# Patient Record
Sex: FEMALE | Race: WHITE | NOT HISPANIC OR LATINO | Employment: OTHER | URBAN - METROPOLITAN AREA
[De-identification: names, ages, dates, MRNs, and addresses within clinical notes are randomized per-mention and may not be internally consistent; named-entity substitution may affect disease eponyms.]

---

## 2017-10-05 ENCOUNTER — OFFICE VISIT (OUTPATIENT)
Dept: ALLERGY | Facility: CLINIC | Age: 77
End: 2017-10-05
Payer: MEDICARE

## 2017-10-05 DIAGNOSIS — L23.0 ALLERGIC CONTACT DERMATITIS DUE TO METALS: ICD-10-CM

## 2017-10-05 DIAGNOSIS — L23.1 ALLERGIC CONTACT DERMATITIS DUE TO ADHESIVES: Primary | ICD-10-CM

## 2017-10-05 DIAGNOSIS — L30.9 DERMATITIS: ICD-10-CM

## 2017-10-05 PROCEDURE — 99213 OFFICE O/P EST LOW 20 MIN: CPT | Mod: PBBFAC | Performed by: ALLERGY & IMMUNOLOGY

## 2017-10-05 PROCEDURE — 99204 OFFICE O/P NEW MOD 45 MIN: CPT | Mod: S$PBB,,, | Performed by: ALLERGY & IMMUNOLOGY

## 2017-10-05 PROCEDURE — 99999 PR PBB SHADOW E&M-EST. PATIENT-LVL III: CPT | Mod: PBBFAC,,, | Performed by: ALLERGY & IMMUNOLOGY

## 2017-10-05 RX ORDER — FAMOTIDINE 10 MG/1
10 TABLET ORAL 2 TIMES DAILY
COMMUNITY
End: 2020-07-22

## 2017-10-05 RX ORDER — MINERAL OIL
180 ENEMA (ML) RECTAL 2 TIMES DAILY
COMMUNITY
End: 2018-04-16

## 2017-10-06 VITALS
TEMPERATURE: 98 F | DIASTOLIC BLOOD PRESSURE: 70 MMHG | HEART RATE: 76 BPM | WEIGHT: 132.06 LBS | BODY MASS INDEX: 22 KG/M2 | HEIGHT: 65 IN | SYSTOLIC BLOOD PRESSURE: 120 MMHG | RESPIRATION RATE: 15 BRPM

## 2017-10-06 NOTE — PROGRESS NOTES
Chief complaint: ALLERGIES    This note was dictated using voice recognition software and may contain errors.    History:    She had a 10 AM appointment on Thursday over 5 2017.  She was accompanied by her .  He was present throughout the entire appointment.    She brought with her multiple copies of outside medical records.  She has experienced ALLERGIC contact dermatitis.  She brought with her the results of diagnostic patch testing which has been performed in the past.  She has seen Dr. Quiñonez and  or dermatology evaluation care and treatment.    In the past diagnostic patch testing was performed April 11, 2014 and February 13, 2017.    In 2014 positive patch tests were noted for lanolin, balsam of [place], fragrance mix and tin oxide.  In 2017 positive patch tests were noted for nickel, bronopol ,linalool, cinnamic aldehyde.    She is been symptomatic for at least 3.5 years.  She stated 2 weeks ago after eating a salad she stated her facial dermatitis worsen significantly.  She was told that conceivably this could've occurred due to her medical sensitivity.    She is aware of the fact that she does suffer from ALLERGIC dermatitis due to contact with poison IVy.  She is aware of the fact that bandages with adhesives will provoke contact dermatitis.    She does not believe that she is ALLERGIC to any foods or animals.  She does not wear fingernail polish.  She does not wear goggles about her eyes.  She does use Vanicream.    Review of systems: She experiences itching of the scalp in addition to inflammation of the skin of the face about her eyes and mouth with associated itching and flaking of the skin.  She stated that she wishes to feel improved and is willing to do what is needed again improvement.    Information in her medical record regarding her past medical history family history and social history was reviewed and updated today.  Significant additions if any are as noted above or  below.    Exam:    In general she is in no distress.  She is alert oriented well-developed in good mood and attentive  Gait steady  Head no swelling noted  Skin erythema of the skin of the upper eyelids and below the eyes observed and also at each angle of the mouth extending down toward the chin.  Erythema of the scalp observed in limited.  No urticaria noted  Eyes sclera white conjunctiva pink  Ears not inflamed tympanic membranes not inflamed  Nose patent no polyps seen  Mouth no swelling or inflammation of the lips tongue or in the throat noted  Neck no masses or thyromegaly noted  Lymph nodes no significant cervical or epitrochlear lymphadenopathy noted  Lungs clear to auscultation  Heart no murmurs heard regular rhythm  Extremities no swelling or inflammation of the hands or legs noted    Impression:    #1 dermatitis  #2 itching  #3 ALLERGIC contact dermatitis  #4 other health concerns as noted in her medical record    Assessment and plan:    Her appointment was 60 minutes in duration spent entirely in face-to-face contact.  More than 50% of the visit was spent in counseling and coordination of care and in reviewing copies of outside medical records.    I told her I am not a dermatologist.  I told her I did not perform patch tests.  I told her that I would be happy to speak with Dr. López in dermatology regarding since to consider for additional evaluation and treatment.  She stated that she would be very appreciative if I will do so.  After I have had an opportunity to speak with Dr. López in dermatology I will contact her.  Additional recommendations may be made at that time.  Given the office phone number.  Should she have additional questions or concerns she was instructed to call.    I recommended that she continue to avoid all substances which she is observed provoke an adverse effect.  We discussed the use of cool or cold compresses to the facial skin with subsequent application of a bland material such  as olive oil.  I reviewed with her that dry skin may cause itching.

## 2017-10-09 ENCOUNTER — TELEPHONE (OUTPATIENT)
Dept: ALLERGY | Facility: CLINIC | Age: 77
End: 2017-10-09

## 2017-10-09 ENCOUNTER — OFFICE VISIT (OUTPATIENT)
Dept: DERMATOLOGY | Facility: CLINIC | Age: 77
End: 2017-10-09
Payer: MEDICARE

## 2017-10-09 VITALS — WEIGHT: 132 LBS | BODY MASS INDEX: 21.97 KG/M2

## 2017-10-09 DIAGNOSIS — L23.9 ALLERGIC DERMATITIS: Primary | ICD-10-CM

## 2017-10-09 PROCEDURE — 99213 OFFICE O/P EST LOW 20 MIN: CPT | Mod: PBBFAC,PO | Performed by: DERMATOLOGY

## 2017-10-09 PROCEDURE — 99999 PR PBB SHADOW E&M-EST. PATIENT-LVL III: CPT | Mod: PBBFAC,,, | Performed by: DERMATOLOGY

## 2017-10-09 PROCEDURE — 99202 OFFICE O/P NEW SF 15 MIN: CPT | Mod: S$PBB,,, | Performed by: DERMATOLOGY

## 2017-10-09 RX ORDER — CALCIUM CARBONATE/VITAMIN D3 600MG-5MCG
TABLET ORAL
COMMUNITY
End: 2020-07-22

## 2017-10-09 RX ORDER — CHOLECALCIFEROL (VITAMIN D3) 125 MCG
5000 CAPSULE ORAL
COMMUNITY
End: 2020-07-22

## 2017-10-09 RX ORDER — ATORVASTATIN CALCIUM 10 MG/1
10 TABLET, FILM COATED ORAL
COMMUNITY
End: 2018-04-16

## 2017-10-09 RX ORDER — FAMOTIDINE 20 MG/1
20 TABLET, FILM COATED ORAL
COMMUNITY
End: 2018-04-16

## 2017-10-09 RX ORDER — MINERAL OIL
180 ENEMA (ML) RECTAL
COMMUNITY
End: 2018-04-16

## 2017-10-09 RX ORDER — ASPIRIN 81 MG/1
81 TABLET ORAL
COMMUNITY
Start: 2017-08-31 | End: 2020-07-22

## 2017-10-09 NOTE — LETTER
October 9, 2017      Edd Desouza MD  [address] [place] [address]           [place] [facility]  [address]  [place] [address]  Phone: [phone]  Fax: [phone]          Patient: Virgie Cherry   MR Number: [id number]   YOB: 1940   Date of Visit: 10/9/2017       Dear Dr. Edd Desouza:    Thank you for referring Virgie Cherry to me for evaluation. Attached you will find relevant portions of my assessment and plan of care.    If you have questions, please do not hesitate to call me. I look forward to following Virgie Cherry along with you.    Sincerely,    Benita Nina MD    Enclosure  CC:  No Recipients    If you would like to receive this communication electronically, please contact [email or web address] or [phone] to request more information on [facility] access.    For providers and/or their staff who would like to refer a patient to [facility], please contact us through our one-stop-shop provider referral line, [facility], at [phone].    If you feel you have received this communication in error or would no longer like to receive these types of communications, please e-mail [email or web address]

## 2017-10-09 NOTE — TELEPHONE ENCOUNTER
This note was dictated using voice recognition software and may contain errors.    I finished my telephone conversation with her at about 9:08 AM on Monday, October 9, 2017.  She had an appointment with me on Thursday, October 5.    I told her this morning I did have an opportunity to speak with Dr. López in dermatology.  Dr. López suggested consideration be given to obtaining evaluation by Dr. eBnita Nina or Dr. Sarah Anderson.    As of the time of our conversation this morning she stated that she is somewhat more symptomatic when she was last Thursday when she saw me on October 5.    Should she have additional questions or concerns she may call.  I was able to provide her the contact phone number for .

## 2017-10-09 NOTE — PROGRESS NOTES
Subjective:       Patient ID:  Virgie Cherry is a 77 y.o. female who presents for   Chief Complaint   Patient presents with    Skin Discoloration     face     History of Present Illness: The patient presents with chief complaint of rash.  Location: face  Duration: years  Signs/Symptoms: itch    Prior treatments: many in past including systemic cortisone and cyclosporine, none now except vanicream, free and clear shampoo, aquanil cleanser    Previous appt allergy 10/5:  She brought with her multiple copies of outside medical records.  She has experienced ALLERGIC contact dermatitis.  She brought with her the results of diagnostic patch testing which has been performed in the past.  She has seen Dr. Quiñonez and  or dermatology evaluation care and treatment.  In 2014 positive patch tests were noted for lanolin, balsam of [place], fragrance mix and tin oxide.  In 2017 positive patch tests were noted for nickel, bronopol ,linalool, cinnamic aldehyde.     She is been symptomatic for at least 3.5 years.  She stated 2 weeks ago after eating a salad she stated her facial dermatitis worsen significantly.  She was told that conceivably this could've occurred due to her medical sensitivity.     She is aware of the fact that she does suffer from ALLERGIC dermatitis due to contact with poison IVy.  She is aware of the fact that bandages with adhesives will provoke contact dermatitis.     She does not believe that she is ALLERGIC to any foods or animals.  She does not wear fingernail polish.  She does not wear goggles about her eyes.  She does use Vanicream.     Review of systems: She experiences itching of the scalp in addition to inflammation of the skin of the face about her eyes and mouth with associated itching and flaking of the skin.   I told her I am not a dermatologist.  I told her I did not perform patch tests.  I told her that I would be happy to speak with Dr. López in dermatology regarding since to  consider for additional evaluation and treatment.  She stated that she would be very appreciative if I will do so.  After I have had an opportunity to speak with Dr. López in dermatology I will contact her.  Additional recommendations may be made at that time.  Given the office phone number.  Should she have additional questions or concerns she was instructed to call.     I recommended that she continue to avoid all substances which she is observed provoke an adverse effect.  We discussed the use of cool or cold compresses to the facial skin with subsequent application of a bland material such as olive oil.  I reviewed with her that dry skin may cause itching.              Review of Systems   Constitutional: Negative for fever.   Skin: Positive for itching and rash.   Hematologic/Lymphatic: Does not bruise/bleed easily.        Objective:    Physical Exam   Constitutional: She appears well-developed and well-nourished. No distress.   Neurological: She is alert and oriented to person, place, and time. She is not disoriented.   Psychiatric: She has a normal mood and affect.   Skin:   Areas Examined (abnormalities noted in diagram):   Scalp / Hair Palpated and Inspected  Head / Face Inspection Performed  Neck Inspection Performed  Chest / Axilla Inspection Performed  Back Inspection Performed  RUE Inspected  LUE Inspection Performed              Diagram Legend      Erythematous eczematous patches and plaques         Assessment / Plan:        Allergic dermatitis  In 2014 positive patch tests were noted for lanolin, balsam of [place], fragrance mix and tin oxide.  In 2017 positive patch tests were noted for nickel, bronopol ,linalool, cinnamic aldehyde.  Synalar ug bid until clear  Basis soap  cerave lotion  Will send list from [employer] to use  Keep hands away from face  Avoid contact with persons wearing perfume             Return if symptoms worsen or fail to improve.

## 2018-04-16 ENCOUNTER — OFFICE VISIT (OUTPATIENT)
Dept: OBSTETRICS AND GYNECOLOGY | Facility: CLINIC | Age: 78
End: 2018-04-16
Payer: MEDICARE

## 2018-04-16 VITALS
DIASTOLIC BLOOD PRESSURE: 76 MMHG | WEIGHT: 130.5 LBS | BODY MASS INDEX: 21.74 KG/M2 | HEIGHT: 65 IN | SYSTOLIC BLOOD PRESSURE: 124 MMHG

## 2018-04-16 DIAGNOSIS — Z01.419 ENCOUNTER FOR GYNECOLOGICAL EXAMINATION WITHOUT ABNORMAL FINDING: Primary | ICD-10-CM

## 2018-04-16 PROCEDURE — 99999 PR PBB SHADOW E&M-EST. PATIENT-LVL III: CPT | Mod: PBBFAC,,, | Performed by: OBSTETRICS & GYNECOLOGY

## 2018-04-16 PROCEDURE — G0439 PPPS, SUBSEQ VISIT: HCPCS | Mod: ,,, | Performed by: OBSTETRICS & GYNECOLOGY

## 2018-04-16 PROCEDURE — 99213 OFFICE O/P EST LOW 20 MIN: CPT | Mod: PBBFAC | Performed by: OBSTETRICS & GYNECOLOGY

## 2018-04-16 RX ORDER — CETIRIZINE HYDROCHLORIDE 10 MG/1
10 TABLET ORAL 2 TIMES DAILY
COMMUNITY
End: 2020-07-22

## 2018-04-19 NOTE — PROGRESS NOTES
CC: Well woman exam    Virgie Cherry is a 78 y.o. female  presents for a well woman exam.  LMP: No LMP recorded. Patient is postmenopausal..  No issues, problems, or complaints.    Past Medical History:   Diagnosis Date    Breast disorder     pre-cancerous cells, prophylactic double mastectomies. treated by Dr. Salinas >30y ago    Hypertension     Hypotension     Multiple allergies      Past Surgical History:   Procedure Laterality Date    masectomy      MASTECTOMY       Social History     Social History    Marital status:      Spouse name: N/A    Number of children: N/A    Years of education: N/A     Social History Main Topics    Smoking status: Never Smoker    Smokeless tobacco: Never Used    Alcohol use No    Drug use: No    Sexual activity: No     Other Topics Concern    None     Social History Narrative    None     Family History   Problem Relation Age of Onset    Heart attack Father     Colon cancer Brother     Breast cancer Sister     Diabetes Neg Hx     Ovarian cancer Neg Hx     Miscarriages / Stillbirths Neg Hx     Hypertension Neg Hx     Eclampsia Neg Hx      labor Neg Hx     Stroke Neg Hx     Cancer Neg Hx      OB History      Para Term  AB Living    3 3 3 0 0 3    SAB TAB Ectopic Multiple Live Births    0 0 0 0 3          Current Outpatient Prescriptions:     aspirin (ECOTRIN) 81 MG EC tablet, Take 81 mg by mouth., Disp: , Rfl:     CALCIUM CARBONATE-VITAMIN D3 ORAL, Take by mouth., Disp: , Rfl:     calcium-vitamin D tablet 600 mg-200 units, Take by mouth., Disp: , Rfl:     cetirizine (ZYRTEC) 10 MG tablet, Take 10 mg by mouth 2 (two) times daily., Disp: , Rfl:     cholecalciferol, vitamin D3, 5,000 unit capsule, Take 5,000 Units by mouth., Disp: , Rfl:     crisaborole 2 % Oint, Apply  topically 2 (two) times daily., Disp: , Rfl:     famotidine (PEPCID) 10 MG tablet, Take 10 mg by mouth 2 (two) times daily., Disp: , Rfl:      "metoprolol succinate (TOPROL-XL) 25 MG 24 hr tablet, Take 25 mg by mouth once daily., Disp: , Rfl:     psyllium (METAMUCIL) powder, Take 1 packet by mouth., Disp: , Rfl:     GYNECOLOGY HISTORY:  No abnormal pap/std    DATA REVIEWED:  Last pap: normal Date: 2008  Last mmg: bilateral mastectomy for pre-cancerous cells-no longer has to do mmg   Last colonoscopy: normal Date: 2012  Last DEXA: osteoporosis Date:  2016    /76 (BP Location: Right arm, Patient Position: Sitting, BP Method: Medium (Manual))   Ht 5' 5" (1.651 m)   Wt 59.2 kg (130 lb 8.2 oz)   BMI 21.72 kg/m²     ROS:  GENERAL: Denies weight gain or weight loss. Feeling well overall.   SKIN: Denies rash or lesions.   HEAD: Denies head injury or headache.   NODES: Denies enlarged lymph nodes.   CHEST: Denies chest pain or shortness of breath.   CARDIOVASCULAR: Denies palpitations or left sided chest pain.   ABDOMEN: No abdominal pain, constipation, diarrhea, nausea, vomiting or rectal bleeding.   URINARY: No frequency, dysuria, hematuria, or burning on urination.  REPRODUCTIVE: See HPI.   BREASTS: The patient denies pain, lumps, or nipple discharge.   HEMATOLOGIC: No easy bruisability or excessive bleeding.   MUSCULOSKELETAL: Denies joint pain or swelling.   NEUROLOGIC: Denies syncope or weakness.   PSYCHIATRIC: Denies depression, anxiety or mood swings.    PHYSICAL EXAM:    APPEARANCE: Well nourished, well developed, in no acute distress.  AFFECT: WNL, alert and oriented x 3  SKIN: No acne or hirsutism  NECK: Neck symmetric without masses or thyromegaly  NODES: No inguinal, cervical, axillary, or femoral lymph node enlargement  CHEST: Good respiratory effect  ABDOMEN: Soft.  No tenderness or masses.  No hepatosplenomegaly.  No hernias.  BREASTS: Symmetrical, no skin changes or visible lesions.  No palpable masses, nipple discharge bilaterally.  PELVIC: Normal external genitalia without lesions.  Normal hair distribution.  Adequate perineal body, " normal urethral meatus.  Vagina atrophic without lesions or discharge.  Cervix pink, without lesions, discharge or tenderness.  Grade 1 cystocele, grade 2-3 rectocele.  Bimanual exam shows uterus to be normal size, regular, mobile and nontender.  Adnexa without masses or tenderness.   EXTREMITIES: No edema.    Encounter for gynecological examination without abnormal finding    Patient was counseled today on A.C.S. Pap guidelines (q3) and recommendations for yearly pelvic exams, yearly mammograms starting age 40, and clinical breast exams; to see her PCP for other health maintenance.

## 2019-08-07 ENCOUNTER — TELEPHONE (OUTPATIENT)
Dept: OBSTETRICS AND GYNECOLOGY | Facility: CLINIC | Age: 79
End: 2019-08-07

## 2019-08-07 NOTE — TELEPHONE ENCOUNTER
----- Message from Selina Navarro sent at 8/7/2019  8:47 AM CDT -----  Contact: Pt  Please contact pt to schedule an appointment. ([phone])

## 2019-08-07 NOTE — TELEPHONE ENCOUNTER
Returned call to patient.  She requested a wwe with Dr. Newton.  Offered 09/06/19, she declined and requested the next available date.  Appt 09/09/19 at 9:30 am, she confirmed appt and location.

## 2020-07-22 ENCOUNTER — OFFICE VISIT (OUTPATIENT)
Dept: OBSTETRICS AND GYNECOLOGY | Facility: CLINIC | Age: 80
End: 2020-07-22
Payer: MEDICARE

## 2020-07-22 VITALS
DIASTOLIC BLOOD PRESSURE: 72 MMHG | WEIGHT: 126.31 LBS | BODY MASS INDEX: 21.04 KG/M2 | SYSTOLIC BLOOD PRESSURE: 136 MMHG | HEIGHT: 65 IN

## 2020-07-22 DIAGNOSIS — Z01.419 ENCOUNTER FOR GYNECOLOGICAL EXAMINATION WITHOUT ABNORMAL FINDING: Primary | ICD-10-CM

## 2020-07-22 DIAGNOSIS — N64.4 MASTODYNIA OF RIGHT BREAST: ICD-10-CM

## 2020-07-22 PROCEDURE — 99999 PR PBB SHADOW E&M-EST. PATIENT-LVL III: ICD-10-PCS | Mod: PBBFAC,,, | Performed by: OBSTETRICS & GYNECOLOGY

## 2020-07-22 PROCEDURE — G0101 PR CA SCREEN;PELVIC/BREAST EXAM: ICD-10-PCS | Mod: S$PBB,,, | Performed by: OBSTETRICS & GYNECOLOGY

## 2020-07-22 PROCEDURE — G0101 CA SCREEN;PELVIC/BREAST EXAM: HCPCS | Mod: S$PBB,,, | Performed by: OBSTETRICS & GYNECOLOGY

## 2020-07-22 PROCEDURE — 99213 OFFICE O/P EST LOW 20 MIN: CPT | Mod: PBBFAC | Performed by: OBSTETRICS & GYNECOLOGY

## 2020-07-22 PROCEDURE — 99999 PR PBB SHADOW E&M-EST. PATIENT-LVL III: CPT | Mod: PBBFAC,,, | Performed by: OBSTETRICS & GYNECOLOGY

## 2020-07-22 RX ORDER — AMOXICILLIN 500 MG
2 CAPSULE ORAL DAILY
COMMUNITY

## 2020-07-23 NOTE — PROGRESS NOTES
CC: Well woman exam    Virgie Cherry is a 80 y.o. female  presents for a well woman exam.  LMP: No LMP recorded. Patient is postmenopausal..  C/o right chest wall pain over past week, now improved w/ topical ointment    Past Medical History:   Diagnosis Date    Breast disorder     pre-cancerous cells, prophylactic double mastectomies. treated by Dr. Salinas >30y ago    Fall 2020    Hypertension     Hypotension     Multiple allergies      Past Surgical History:   Procedure Laterality Date    BILATERAL MASTECTOMY      Dr. Salinas, for pre-cancerous cells, age mid-30s     Social History     Socioeconomic History    Marital status:      Spouse name: Not on file    Number of children: Not on file    Years of education: Not on file    Highest education level: Not on file   Occupational History    Not on file   Social Needs    Financial resource strain: Not on file    Food insecurity     Worry: Not on file     Inability: Not on file    Transportation needs     Medical: Not on file     Non-medical: Not on file   Tobacco Use    Smoking status: Never Smoker    Smokeless tobacco: Never Used   Substance and Sexual Activity    Alcohol use: No    Drug use: No    Sexual activity: Not Currently     Partners: Male     Birth control/protection: Post-menopausal   Lifestyle    Physical activity     Days per week: Not on file     Minutes per session: Not on file    Stress: Not on file   Relationships    Social connections     Talks on phone: Not on file     Gets together: Not on file     Attends Anabaptism service: Not on file     Active member of club or organization: Not on file     Attends meetings of clubs or organizations: Not on file     Relationship status: Not on file   Other Topics Concern    Are you pregnant or think you may be? Not Asked    Breast-feeding Not Asked   Social History Narrative    Not on file     Family History   Problem Relation Age of Onset    Heart attack Father      "Colon cancer Brother     Breast cancer Sister     Diabetes Neg Hx     Ovarian cancer Neg Hx     Miscarriages / Stillbirths Neg Hx     Hypertension Neg Hx     Eclampsia Neg Hx      labor Neg Hx     Stroke Neg Hx     Cancer Neg Hx      OB History        3    Para   3    Term   3       0    AB   0    Living   3       SAB   0    TAB   0    Ectopic   0    Multiple   0    Live Births   3                 Current Outpatient Medications:     ascorbic acid, vitamin C, (VITAMIN C) 100 MG tablet, Take 100 mg by mouth once daily., Disp: , Rfl:     CALCIUM CARBONATE-VITAMIN D3 ORAL, Take by mouth., Disp: , Rfl:     crisaborole 2 % Oint, Apply  topically 2 (two) times daily., Disp: , Rfl:     omega-3 fatty acids/fish oil (FISH OIL-OMEGA-3 FATTY ACIDS) 300-1,000 mg capsule, Take 2 g by mouth once daily., Disp: , Rfl:     sunscreen Lotn, Use SPF 30--50 sunscreens daily to sun exposed skin., Disp: , Rfl:     vitamin E mixed/tocotrienol (VITAMIN E COMPLEX ORAL), Take 1 capsule by mouth., Disp: , Rfl:     GYNECOLOGY HISTORY:  No abnormal pap/std    DATA REVIEWED:  Last pap: no abnormal history  Last mmg: bilateral mastectomy for pre-cancerous cells-no longer has to do mmg   Last colonoscopy: normal Date:   Last DEXA: osteoporosis Date:      /72   Ht 5' 5" (1.651 m)   Wt 57.3 kg (126 lb 5.2 oz)   BMI 21.02 kg/m²     ROS:  GENERAL: Denies weight gain or weight loss. Feeling well overall.   SKIN: Denies rash or lesions.   HEAD: Denies head injury or headache.   NODES: Denies enlarged lymph nodes.   CHEST: Denies chest pain or shortness of breath.   CARDIOVASCULAR: Denies palpitations or left sided chest pain.   ABDOMEN: No abdominal pain, constipation, diarrhea, nausea, vomiting or rectal bleeding.   URINARY: No frequency, dysuria, hematuria, or burning on urination.  REPRODUCTIVE: See HPI.   BREASTS: The patient denies pain, lumps, or nipple discharge.   HEMATOLOGIC: No easy " bruisability or excessive bleeding.   MUSCULOSKELETAL: Denies joint pain or swelling.   NEUROLOGIC: Denies syncope or weakness.   PSYCHIATRIC: Denies depression, anxiety or mood swings.    PHYSICAL EXAM:    APPEARANCE: Well nourished, well developed, in no acute distress.  AFFECT: WNL, alert and oriented x 3  SKIN: No acne or hirsutism  NECK: Neck symmetric without masses or thyromegaly  NODES: No inguinal, cervical, axillary, or femoral lymph node enlargement  CHEST: Good respiratory effect  ABDOMEN: Soft.  No tenderness or masses.  No hepatosplenomegaly.  No hernias.  BREASTS: surgically absent breast tissue w/ surgical scarring. (+) nipples. No palpable masses. Area of concern more upper chest wall, towards midline  PELVIC: Normal external genitalia without lesions.  Normal hair distribution.  Adequate perineal body, normal urethral meatus.  Vagina atrophic without lesions or discharge.  Cervix pink, without lesions, discharge or tenderness.  No significant cystocele or rectocele.  Bimanual exam shows uterus to be normal size, regular, mobile and nontender.  Adnexa without masses or tenderness.   EXTREMITIES: No edema.    Encounter for gynecological examination without abnormal finding    Mastodynia of right breast    Patient was counseled today on A.C.S. Pap guidelines (none) and recommendations for yearly pelvic exams, yearly mammograms starting age 40, and clinical breast exams; to see her PCP for other health maintenance. rec expectant management-ok for topical analgesics, nsaids if can tolerate

## 2021-01-08 ENCOUNTER — IMMUNIZATION (OUTPATIENT)
Dept: INTERNAL MEDICINE | Facility: CLINIC | Age: 81
End: 2021-01-08
Payer: MEDICARE

## 2021-01-08 DIAGNOSIS — Z23 NEED FOR VACCINATION: ICD-10-CM

## 2021-01-08 PROCEDURE — 91300 COVID-19, MRNA, LNP-S, PF, 30 MCG/0.3 ML DOSE VACCINE: CPT | Mod: PBBFAC

## 2021-01-29 ENCOUNTER — IMMUNIZATION (OUTPATIENT)
Dept: INTERNAL MEDICINE | Facility: CLINIC | Age: 81
End: 2021-01-29
Payer: MEDICARE

## 2021-01-29 DIAGNOSIS — Z23 NEED FOR VACCINATION: Primary | ICD-10-CM

## 2021-01-29 PROCEDURE — 0002A COVID-19, MRNA, LNP-S, PF, 30 MCG/0.3 ML DOSE VACCINE: CPT | Mod: PBBFAC

## 2021-01-29 PROCEDURE — 91300 COVID-19, MRNA, LNP-S, PF, 30 MCG/0.3 ML DOSE VACCINE: CPT | Mod: PBBFAC

## 2021-06-04 ENCOUNTER — TELEPHONE (OUTPATIENT)
Dept: OBSTETRICS AND GYNECOLOGY | Facility: CLINIC | Age: 81
End: 2021-06-04

## 2021-07-08 ENCOUNTER — OFFICE VISIT (OUTPATIENT)
Dept: OBSTETRICS AND GYNECOLOGY | Facility: CLINIC | Age: 81
End: 2021-07-08
Payer: MEDICARE

## 2021-07-08 VITALS
HEIGHT: 65 IN | DIASTOLIC BLOOD PRESSURE: 78 MMHG | WEIGHT: 129.63 LBS | BODY MASS INDEX: 21.6 KG/M2 | SYSTOLIC BLOOD PRESSURE: 140 MMHG

## 2021-07-08 DIAGNOSIS — R35.1 NOCTURIA: ICD-10-CM

## 2021-07-08 DIAGNOSIS — N81.6 RECTOCELE: ICD-10-CM

## 2021-07-08 DIAGNOSIS — N81.4 UTEROVAGINAL PROLAPSE: Primary | ICD-10-CM

## 2021-07-08 PROCEDURE — 99213 OFFICE O/P EST LOW 20 MIN: CPT | Mod: PBBFAC | Performed by: OBSTETRICS & GYNECOLOGY

## 2021-07-08 PROCEDURE — 99213 PR OFFICE/OUTPT VISIT, EST, LEVL III, 20-29 MIN: ICD-10-PCS | Mod: S$PBB,,, | Performed by: OBSTETRICS & GYNECOLOGY

## 2021-07-08 PROCEDURE — 99999 PR PBB SHADOW E&M-EST. PATIENT-LVL III: CPT | Mod: PBBFAC,,, | Performed by: OBSTETRICS & GYNECOLOGY

## 2021-07-08 PROCEDURE — 99999 PR PBB SHADOW E&M-EST. PATIENT-LVL III: ICD-10-PCS | Mod: PBBFAC,,, | Performed by: OBSTETRICS & GYNECOLOGY

## 2021-07-08 PROCEDURE — 99213 OFFICE O/P EST LOW 20 MIN: CPT | Mod: S$PBB,,, | Performed by: OBSTETRICS & GYNECOLOGY

## 2021-07-08 RX ORDER — ATORVASTATIN CALCIUM 10 MG/1
1 TABLET, FILM COATED ORAL DAILY
COMMUNITY
Start: 2021-06-29 | End: 2022-06-29